# Patient Record
Sex: FEMALE | Race: WHITE | NOT HISPANIC OR LATINO | URBAN - NONMETROPOLITAN AREA
[De-identification: names, ages, dates, MRNs, and addresses within clinical notes are randomized per-mention and may not be internally consistent; named-entity substitution may affect disease eponyms.]

---

## 2023-06-26 DIAGNOSIS — E78.2 MIXED HYPERLIPIDEMIA: ICD-10-CM

## 2023-06-26 DIAGNOSIS — I10 PRIMARY HYPERTENSION: Primary | ICD-10-CM

## 2023-06-26 RX ORDER — SIMVASTATIN 20 MG/1
20 TABLET, FILM COATED ORAL DAILY
Qty: 90 TABLET | Refills: 1 | Status: SHIPPED | OUTPATIENT
Start: 2023-06-26 | End: 2023-12-18 | Stop reason: SDUPTHER

## 2023-06-26 RX ORDER — SIMVASTATIN 20 MG/1
20 TABLET, FILM COATED ORAL DAILY
COMMUNITY
End: 2023-06-26 | Stop reason: SDUPTHER

## 2023-06-26 RX ORDER — AMLODIPINE BESYLATE 10 MG/1
10 TABLET ORAL DAILY
Qty: 90 TABLET | Refills: 1 | Status: SHIPPED | OUTPATIENT
Start: 2023-06-26 | End: 2023-12-18 | Stop reason: SDUPTHER

## 2023-06-26 RX ORDER — AMLODIPINE BESYLATE 10 MG/1
10 TABLET ORAL DAILY
COMMUNITY
End: 2023-06-26 | Stop reason: SDUPTHER

## 2023-07-26 ENCOUNTER — OFFICE VISIT (OUTPATIENT)
Dept: PRIMARY CARE | Facility: CLINIC | Age: 78
End: 2023-07-26
Payer: MEDICARE

## 2023-07-26 VITALS
OXYGEN SATURATION: 98 % | WEIGHT: 100 LBS | HEART RATE: 88 BPM | DIASTOLIC BLOOD PRESSURE: 78 MMHG | SYSTOLIC BLOOD PRESSURE: 126 MMHG | TEMPERATURE: 97.7 F | BODY MASS INDEX: 21.45 KG/M2

## 2023-07-26 DIAGNOSIS — L98.9 SKIN LESION: ICD-10-CM

## 2023-07-26 DIAGNOSIS — I10 PRIMARY HYPERTENSION: Primary | ICD-10-CM

## 2023-07-26 PROCEDURE — 1036F TOBACCO NON-USER: CPT | Performed by: FAMILY MEDICINE

## 2023-07-26 PROCEDURE — 1159F MED LIST DOCD IN RCRD: CPT | Performed by: FAMILY MEDICINE

## 2023-07-26 PROCEDURE — 3078F DIAST BP <80 MM HG: CPT | Performed by: FAMILY MEDICINE

## 2023-07-26 PROCEDURE — 3074F SYST BP LT 130 MM HG: CPT | Performed by: FAMILY MEDICINE

## 2023-07-26 PROCEDURE — 99214 OFFICE O/P EST MOD 30 MIN: CPT | Performed by: FAMILY MEDICINE

## 2023-07-26 ASSESSMENT — ENCOUNTER SYMPTOMS
DIARRHEA: 0
COUGH: 0
APPETITE CHANGE: 0
DYSPHORIC MOOD: 0
ORTHOPNEA: 0
SLEEP DISTURBANCE: 0
NUMBNESS: 0
ABDOMINAL PAIN: 0
PND: 0
SINUS PRESSURE: 0
ARTHRALGIAS: 0
DYSURIA: 0
NERVOUS/ANXIOUS: 0
WHEEZING: 0
MYALGIAS: 0
NECK PAIN: 0
VOMITING: 0
UNEXPECTED WEIGHT CHANGE: 0
RHINORRHEA: 0
LIGHT-HEADEDNESS: 0
ACTIVITY CHANGE: 0
CONSTIPATION: 0
EYE DISCHARGE: 0
BLOOD IN STOOL: 0
FEVER: 0
SORE THROAT: 0
PALPITATIONS: 0
FLANK PAIN: 0
NAUSEA: 0
DIZZINESS: 0
WEAKNESS: 0
SWEATS: 0
BLURRED VISION: 0
EYE ITCHING: 0
HEADACHES: 0
SHORTNESS OF BREATH: 0
HYPERTENSION: 1
HEMATURIA: 0
JOINT SWELLING: 0

## 2023-07-26 NOTE — PROGRESS NOTES
Subjective   Patient ID: Marleni Childers is a 78 y.o. female who presents for Hypertension and Suspicious Skin Lesion (R SHOULDER).    Hypertension  This is a chronic problem. The current episode started more than 1 year ago. The problem has been resolved since onset. The problem is controlled. Pertinent negatives include no anxiety, blurred vision, chest pain, headaches, malaise/fatigue, neck pain, orthopnea, palpitations, peripheral edema, PND, shortness of breath or sweats. Agents associated with hypertension include NSAIDs. Risk factors for coronary artery disease include dyslipidemia, family history and post-menopausal state. There are no compliance problems.     WELL CONTROLLED B/P NOW ON AMLODIPINE /TAKES STATIN     Review of Systems   Constitutional:  Negative for activity change, appetite change, fever, malaise/fatigue and unexpected weight change.   HENT:  Negative for congestion, ear pain, postnasal drip, rhinorrhea, sinus pressure and sore throat.    Eyes:  Negative for blurred vision, discharge, itching and visual disturbance.   Respiratory:  Negative for cough, shortness of breath and wheezing.    Cardiovascular:  Negative for chest pain, palpitations, orthopnea, leg swelling and PND.   Gastrointestinal:  Negative for abdominal pain, blood in stool, constipation, diarrhea, nausea and vomiting.   Endocrine: Negative for cold intolerance, heat intolerance and polyuria.   Genitourinary:  Negative for dysuria, flank pain and hematuria.   Musculoskeletal:  Negative for arthralgias, gait problem, joint swelling, myalgias and neck pain.   Skin:  Negative for rash.        LESION SHOULDER    Allergic/Immunologic: Negative for environmental allergies and food allergies.   Neurological:  Negative for dizziness, syncope, weakness, light-headedness, numbness and headaches.   Psychiatric/Behavioral:  Negative for dysphoric mood and sleep disturbance. The patient is not nervous/anxious.        Objective   /78    Pulse 88   Temp 36.5 °C (97.7 °F)   Wt 45.4 kg (100 lb)   SpO2 98%   BMI 21.45 kg/m²     Physical Exam  Constitutional:       Appearance: Normal appearance.   HENT:      Head: Normocephalic and atraumatic.      Nose: Nose normal.      Mouth/Throat:      Mouth: Mucous membranes are moist.   Eyes:      Extraocular Movements: Extraocular movements intact.      Pupils: Pupils are equal, round, and reactive to light.   Cardiovascular:      Rate and Rhythm: Normal rate and regular rhythm.   Pulmonary:      Effort: Pulmonary effort is normal.      Breath sounds: Normal breath sounds.   Abdominal:      Palpations: Abdomen is soft.   Musculoskeletal:         General: Normal range of motion.      Cervical back: Normal range of motion and neck supple.   Skin:     General: Skin is warm and dry.      Findings: Lesion present.      Comments: SEBORRHEIC KERATOSES /CARRILLO    Neurological:      General: No focal deficit present.      Mental Status: She is alert and oriented to person, place, and time.   Psychiatric:         Mood and Affect: Mood normal.         Behavior: Behavior normal.         Assessment/Plan   Diagnoses and all orders for this visit:  Primary hypertension  Skin lesion

## 2023-08-30 ENCOUNTER — OFFICE VISIT (OUTPATIENT)
Dept: PRIMARY CARE | Facility: CLINIC | Age: 78
End: 2023-08-30
Payer: MEDICARE

## 2023-08-30 VITALS
HEART RATE: 91 BPM | DIASTOLIC BLOOD PRESSURE: 60 MMHG | OXYGEN SATURATION: 96 % | SYSTOLIC BLOOD PRESSURE: 120 MMHG | BODY MASS INDEX: 21.88 KG/M2 | WEIGHT: 102 LBS | TEMPERATURE: 97.4 F

## 2023-08-30 DIAGNOSIS — L98.9 SKIN LESION: ICD-10-CM

## 2023-08-30 PROCEDURE — 3078F DIAST BP <80 MM HG: CPT | Performed by: FAMILY MEDICINE

## 2023-08-30 PROCEDURE — 11301 SHAVE SKIN LESION 0.6-1.0 CM: CPT | Performed by: FAMILY MEDICINE

## 2023-08-30 PROCEDURE — 1159F MED LIST DOCD IN RCRD: CPT | Performed by: FAMILY MEDICINE

## 2023-08-30 PROCEDURE — 3074F SYST BP LT 130 MM HG: CPT | Performed by: FAMILY MEDICINE

## 2023-08-30 PROCEDURE — 88305 TISSUE EXAM BY PATHOLOGIST: CPT | Performed by: DERMATOLOGY

## 2023-08-30 PROCEDURE — 1036F TOBACCO NON-USER: CPT | Performed by: FAMILY MEDICINE

## 2023-08-30 RX ORDER — LIDOCAINE HYDROCHLORIDE 20 MG/ML
2 INJECTION, SOLUTION INFILTRATION; PERINEURAL ONCE
Status: COMPLETED | OUTPATIENT
Start: 2023-08-30 | End: 2023-08-30

## 2023-08-30 RX ADMIN — LIDOCAINE HYDROCHLORIDE 40 MG: 20 INJECTION, SOLUTION INFILTRATION; PERINEURAL at 11:18

## 2023-08-30 NOTE — PATIENT INSTRUCTIONS
CLEAN DRY AND COVERED   BATHING IS FINE JUST DO NOT SCRUB   FOLLOW IF NEEDED   THE OFFICE WILL CALL WITH THE REPORT

## 2023-08-30 NOTE — PROGRESS NOTES
Subjective   Patient ID: Marleni Childers is a 78 y.o. female who presents for Procedure (Shave biopsy Shoulder L ).    HPI HERE TO REMOVE LESION RIGHT SHOULDER PREVIOUSLY  EVALUATED /DARKER AND CHANGING GETTING BIGGER     Review of SystemsNO INTERVAL HISTORICAL CHANGES IN ANY OF THE 12 SYSTEMS REVIEWED OTHER THAN THIS CHANGING SKIN LESION     Objective   /60   Pulse 91   Temp 36.3 °C (97.4 °F)   Wt 46.3 kg (102 lb)   SpO2 96%   BMI 21.88 kg/m²     Physical ExamSINCE RECENT VISIT NO INTERVAL PHYSICAL CHANGES IN ANY OF THE 12 SYSTEMS REVIEWED OTHER THAN THIS CHANGING SKIN LESION     Patient ID: Marleni Childers is a 78 y.o. female.    Shaving Epidermal/Dermal    Date/Time: 8/30/2023 11:39 AM    Performed by: Gina Ivy DO  Authorized by: Gina Ivy DO    Number of Lesions: 1  Lesion 1:     Body area: upper extremity    Upper extremity location: R shoulder    Malignancy: malignancy unknown      Destruction method: curettage      Chemotherapy injection: yes      Comments:  LESION REMOVED BY SHVING AND SENT TO PATHOLOGY FOR EVALUATION   AG2NO3 CAUTERY DONE FOR HEMOSTASIS   MINIMAL BLEEDING AND PAIN REPORTED   PATIENT TOLERATED PROCEDURE WELL AND LEFT THE OFFICE IN STABLE CONDITION         Assessment/Plan

## 2023-09-05 ENCOUNTER — TELEPHONE (OUTPATIENT)
Dept: PRIMARY CARE | Facility: CLINIC | Age: 78
End: 2023-09-05
Payer: MEDICARE

## 2023-09-05 LAB
COMPLETE PATHOLOGY REPORT: NORMAL
CONVERTED CLINICAL DIAGNOSIS-HISTORY: NORMAL
CONVERTED FINAL DIAGNOSIS: NORMAL
CONVERTED FINAL REPORT PDF LINK TO COPY AND PASTE: NORMAL
CONVERTED GROSS DESCRIPTION: NORMAL
CONVERTED MICROSCOPIC DESCRIPTION: NORMAL

## 2023-10-24 ENCOUNTER — OFFICE VISIT (OUTPATIENT)
Dept: PRIMARY CARE | Facility: CLINIC | Age: 78
End: 2023-10-24
Payer: MEDICARE

## 2023-10-24 VITALS
SYSTOLIC BLOOD PRESSURE: 126 MMHG | TEMPERATURE: 97 F | BODY MASS INDEX: 21.45 KG/M2 | HEART RATE: 89 BPM | DIASTOLIC BLOOD PRESSURE: 62 MMHG | WEIGHT: 100 LBS | OXYGEN SATURATION: 98 %

## 2023-10-24 DIAGNOSIS — K64.9 HEMORRHOIDS, UNSPECIFIED HEMORRHOID TYPE: Primary | ICD-10-CM

## 2023-10-24 DIAGNOSIS — R11.0 NAUSEA: ICD-10-CM

## 2023-10-24 DIAGNOSIS — K59.00 CONSTIPATION, UNSPECIFIED CONSTIPATION TYPE: ICD-10-CM

## 2023-10-24 PROBLEM — H02.9 LESION OF EYELID: Status: ACTIVE | Noted: 2023-10-24

## 2023-10-24 PROBLEM — K21.9 GASTROESOPHAGEAL REFLUX DISEASE: Status: ACTIVE | Noted: 2023-10-24

## 2023-10-24 PROBLEM — L81.9 ATYPICAL PIGMENTED LESION: Status: ACTIVE | Noted: 2023-10-24

## 2023-10-24 PROBLEM — H71.91 CHOLESTEATOMA OF RIGHT EAR: Status: ACTIVE | Noted: 2023-10-24

## 2023-10-24 PROBLEM — L98.9 SKIN LESION: Status: ACTIVE | Noted: 2023-10-24

## 2023-10-24 PROBLEM — M81.0 OSTEOPOROSIS: Status: ACTIVE | Noted: 2023-10-24

## 2023-10-24 PROBLEM — B07.9 VERRUCOUS LESION OF SKIN: Status: ACTIVE | Noted: 2023-10-24

## 2023-10-24 PROBLEM — M19.90 ARTHRITIS: Status: ACTIVE | Noted: 2023-10-24

## 2023-10-24 PROBLEM — N18.30 CKD (CHRONIC KIDNEY DISEASE), STAGE III (MULTI): Status: ACTIVE | Noted: 2023-10-24

## 2023-10-24 PROBLEM — E78.00 PURE HYPERCHOLESTEROLEMIA: Status: ACTIVE | Noted: 2023-10-24

## 2023-10-24 PROCEDURE — 1036F TOBACCO NON-USER: CPT

## 2023-10-24 PROCEDURE — 99214 OFFICE O/P EST MOD 30 MIN: CPT

## 2023-10-24 PROCEDURE — 1160F RVW MEDS BY RX/DR IN RCRD: CPT

## 2023-10-24 PROCEDURE — 3078F DIAST BP <80 MM HG: CPT

## 2023-10-24 PROCEDURE — 1159F MED LIST DOCD IN RCRD: CPT

## 2023-10-24 PROCEDURE — 3074F SYST BP LT 130 MM HG: CPT

## 2023-10-24 RX ORDER — DOCUSATE SODIUM 100 MG/1
100 CAPSULE, LIQUID FILLED ORAL 2 TIMES DAILY PRN
Qty: 28 CAPSULE | Refills: 0 | Status: SHIPPED | OUTPATIENT
Start: 2023-10-24 | End: 2023-11-07

## 2023-10-24 RX ORDER — ONDANSETRON 4 MG/1
4 TABLET, ORALLY DISINTEGRATING ORAL EVERY 8 HOURS PRN
Qty: 20 TABLET | Refills: 0 | Status: SHIPPED | OUTPATIENT
Start: 2023-10-24 | End: 2023-10-31

## 2023-10-24 NOTE — PATIENT INSTRUCTIONS
Steroid cream continue  Stool softener twice daily for constipation  Zofran as needed every 8 hours for nausea    Thank you for coming in today, if any questions or concerns arise, please call my office.   HEATH Park-CNP

## 2023-10-24 NOTE — PROGRESS NOTES
Subjective   Patient ID: Marleni Childers is a 78 y.o. female who presents for Ear Drainage (R ear Scarring. Current drainage causing her to vomit.) and Rectal Bleeding (Pink in the toilet and bright red on the toilet paper when wiping. Small pellet like stools.).  Subjective  Marleni Childers is a 78 y.o. female who presents for evaluation of possible hemorrhoids. Onset of symptoms was sudden. Symptoms have been stable since that time. Symptoms include: bleeding which only occurs with bowel movements and painful defecation. Patient denies family hx of colorectal CA, maroon colored stools, melena, and weight loss. Treatment to date has been anusol HC.    Objective  /62   Pulse 89   Temp 36.1 °C (97 °F)   Wt 45.4 kg (100 lb)   SpO2 98%   BMI 21.45 kg/m²   Rectal: deferred.    Assessment/Plan  External hemorrhoid  Internal hemorrhoids.  See orders for medications.          Vitals:    10/24/23 1528   BP: 126/62   Pulse: 89   Temp: 36.1 °C (97 °F)   SpO2: 98%       Review of Systems    Objective   Physical Exam  HENT:      Right Ear: Tympanic membrane is retracted.      Left Ear: Tympanic membrane is retracted.         Assessment/Plan   Problem List Items Addressed This Visit    None  Visit Diagnoses       Hemorrhoids, unspecified hemorrhoid type    -  Primary    Constipation, unspecified constipation type        Relevant Medications    docusate sodium (Colace) 100 mg capsule    Nausea        Relevant Medications    ondansetron ODT (Zofran-ODT) 4 mg disintegrating tablet                 Thank you for coming in today, please call my office if you have any concerns or questions.     Aly JOE, CNP

## 2023-12-18 DIAGNOSIS — I10 PRIMARY HYPERTENSION: ICD-10-CM

## 2023-12-18 DIAGNOSIS — E78.2 MIXED HYPERLIPIDEMIA: ICD-10-CM

## 2023-12-18 RX ORDER — SIMVASTATIN 20 MG/1
20 TABLET, FILM COATED ORAL DAILY
Qty: 90 TABLET | Refills: 1 | Status: SHIPPED | OUTPATIENT
Start: 2023-12-18 | End: 2024-03-21

## 2023-12-18 RX ORDER — AMLODIPINE BESYLATE 10 MG/1
10 TABLET ORAL DAILY
Qty: 90 TABLET | Refills: 1 | Status: SHIPPED | OUTPATIENT
Start: 2023-12-18 | End: 2024-03-21

## 2024-01-12 ENCOUNTER — OFFICE VISIT (OUTPATIENT)
Dept: PRIMARY CARE | Facility: CLINIC | Age: 79
End: 2024-01-12
Payer: MEDICARE

## 2024-01-12 VITALS
BODY MASS INDEX: 21.14 KG/M2 | SYSTOLIC BLOOD PRESSURE: 124 MMHG | OXYGEN SATURATION: 98 % | DIASTOLIC BLOOD PRESSURE: 60 MMHG | HEIGHT: 57 IN | HEART RATE: 90 BPM | TEMPERATURE: 97.8 F | WEIGHT: 98 LBS

## 2024-01-12 DIAGNOSIS — Z12.31 ENCOUNTER FOR SCREENING MAMMOGRAM FOR BREAST CANCER: ICD-10-CM

## 2024-01-12 DIAGNOSIS — Z78.0 ASYMPTOMATIC MENOPAUSAL STATE: ICD-10-CM

## 2024-01-12 DIAGNOSIS — Z00.00 ROUTINE GENERAL MEDICAL EXAMINATION AT HEALTH CARE FACILITY: Primary | ICD-10-CM

## 2024-01-12 PROCEDURE — 1170F FXNL STATUS ASSESSED: CPT | Performed by: FAMILY MEDICINE

## 2024-01-12 PROCEDURE — 1036F TOBACCO NON-USER: CPT | Performed by: FAMILY MEDICINE

## 2024-01-12 PROCEDURE — 1159F MED LIST DOCD IN RCRD: CPT | Performed by: FAMILY MEDICINE

## 2024-01-12 PROCEDURE — 3078F DIAST BP <80 MM HG: CPT | Performed by: FAMILY MEDICINE

## 2024-01-12 PROCEDURE — 3074F SYST BP LT 130 MM HG: CPT | Performed by: FAMILY MEDICINE

## 2024-01-12 PROCEDURE — G0439 PPPS, SUBSEQ VISIT: HCPCS | Performed by: FAMILY MEDICINE

## 2024-01-12 ASSESSMENT — ENCOUNTER SYMPTOMS
DEPRESSION: 0
LOSS OF SENSATION IN FEET: 0
SLEEP DISTURBANCE: 0
NAUSEA: 0
EYE DISCHARGE: 0
HEADACHES: 0
NERVOUS/ANXIOUS: 0
DYSURIA: 0
NUMBNESS: 0
ACTIVITY CHANGE: 0
FEVER: 0
FLANK PAIN: 0
DYSPHORIC MOOD: 0
BLOOD IN STOOL: 0
EYE ITCHING: 0
PALPITATIONS: 0
JOINT SWELLING: 0
ARTHRALGIAS: 0
LIGHT-HEADEDNESS: 0
UNEXPECTED WEIGHT CHANGE: 0
SHORTNESS OF BREATH: 0
ABDOMINAL PAIN: 0
CONSTIPATION: 0
COUGH: 0
RHINORRHEA: 0
APPETITE CHANGE: 0
SORE THROAT: 0
DIARRHEA: 0
WHEEZING: 0
MYALGIAS: 0
OCCASIONAL FEELINGS OF UNSTEADINESS: 0
VOMITING: 0
SINUS PRESSURE: 0
HEMATURIA: 0
WEAKNESS: 0
DIZZINESS: 0

## 2024-01-12 ASSESSMENT — PATIENT HEALTH QUESTIONNAIRE - PHQ9
SUM OF ALL RESPONSES TO PHQ9 QUESTIONS 1 AND 2: 0
2. FEELING DOWN, DEPRESSED OR HOPELESS: NOT AT ALL
1. LITTLE INTEREST OR PLEASURE IN DOING THINGS: NOT AT ALL

## 2024-01-12 ASSESSMENT — ACTIVITIES OF DAILY LIVING (ADL)
MANAGING_FINANCES: INDEPENDENT
TAKING_MEDICATION: INDEPENDENT
BATHING: INDEPENDENT
DRESSING: INDEPENDENT
DOING_HOUSEWORK: INDEPENDENT
GROCERY_SHOPPING: INDEPENDENT

## 2024-01-12 NOTE — PROGRESS NOTES
"Subjective   Reason for Visit: Marleni Childers is an 78 y.o. female here for a Medicare Wellness visit.     Past Medical, Surgical, and Family History reviewed and updated in chart.    Reviewed all medications by prescribing practitioner or clinical pharmacist (such as prescriptions, OTCs, herbal therapies and supplements) and documented in the medical record.    HPI FEELS GOOD MOST DAYS     Patient Care Team:  Gina Ivy DO as PCP - General     Review of Systems   Constitutional:  Negative for activity change, appetite change, fever and unexpected weight change.   HENT:  Negative for congestion, ear pain, postnasal drip, rhinorrhea, sinus pressure and sore throat.    Eyes:  Negative for discharge, itching and visual disturbance.   Respiratory:  Negative for cough, shortness of breath and wheezing.    Cardiovascular:  Negative for chest pain, palpitations and leg swelling.   Gastrointestinal:  Negative for abdominal pain, blood in stool, constipation, diarrhea, nausea and vomiting.   Endocrine: Negative for cold intolerance, heat intolerance and polyuria.   Genitourinary:  Negative for dysuria, flank pain and hematuria.   Musculoskeletal:  Negative for arthralgias, gait problem, joint swelling and myalgias.   Skin:  Negative for rash.   Allergic/Immunologic: Negative for environmental allergies and food allergies.   Neurological:  Negative for dizziness, syncope, weakness, light-headedness, numbness and headaches.   Psychiatric/Behavioral:  Negative for dysphoric mood and sleep disturbance. The patient is not nervous/anxious.        Objective   Vitals:  /60   Pulse 90   Temp 36.6 °C (97.8 °F)   Ht 1.448 m (4' 9\")   Wt (!) 44.5 kg (98 lb)   SpO2 98%   BMI 21.21 kg/m²       Physical Exam  Vitals and nursing note reviewed.   Constitutional:       Appearance: Normal appearance.   HENT:      Head: Normocephalic.      Mouth/Throat:      Mouth: Mucous membranes are moist.   Cardiovascular:      Rate and " Rhythm: Normal rate and regular rhythm.      Pulses: Normal pulses.      Heart sounds: Normal heart sounds. No murmur heard.     No friction rub. No gallop.   Pulmonary:      Effort: Pulmonary effort is normal. No respiratory distress.      Breath sounds: Normal breath sounds. No wheezing.   Abdominal:      General: Bowel sounds are normal. There is no distension.      Palpations: Abdomen is soft.      Tenderness: There is no abdominal tenderness.   Musculoskeletal:         General: No deformity. Normal range of motion.   Skin:     General: Skin is warm and dry.      Capillary Refill: Capillary refill takes less than 2 seconds.   Neurological:      General: No focal deficit present.      Mental Status: She is alert and oriented to person, place, and time.   Psychiatric:         Mood and Affect: Mood normal.         Assessment/Plan   Problem List Items Addressed This Visit       Encounter for screening mammogram for breast cancer - Primary    Relevant Orders    BI mammo bilateral screening tomosynthesis    Asymptomatic menopausal state    Relevant Orders    XR DEXA bone density

## 2024-03-21 DIAGNOSIS — E78.2 MIXED HYPERLIPIDEMIA: ICD-10-CM

## 2024-03-21 DIAGNOSIS — I10 PRIMARY HYPERTENSION: ICD-10-CM

## 2024-03-21 RX ORDER — AMLODIPINE BESYLATE 10 MG/1
10 TABLET ORAL DAILY
Qty: 90 TABLET | Refills: 1 | Status: SHIPPED | OUTPATIENT
Start: 2024-03-21

## 2024-03-21 RX ORDER — SIMVASTATIN 20 MG/1
20 TABLET, FILM COATED ORAL DAILY
Qty: 90 TABLET | Refills: 1 | Status: SHIPPED | OUTPATIENT
Start: 2024-03-21

## 2024-06-14 DIAGNOSIS — I10 PRIMARY HYPERTENSION: ICD-10-CM

## 2024-06-14 DIAGNOSIS — E78.2 MIXED HYPERLIPIDEMIA: ICD-10-CM

## 2024-06-14 RX ORDER — AMLODIPINE BESYLATE 10 MG/1
10 TABLET ORAL DAILY
Qty: 90 TABLET | Refills: 1 | Status: SHIPPED | OUTPATIENT
Start: 2024-06-14

## 2024-06-14 RX ORDER — SIMVASTATIN 20 MG/1
20 TABLET, FILM COATED ORAL DAILY
Qty: 90 TABLET | Refills: 1 | Status: SHIPPED | OUTPATIENT
Start: 2024-06-14

## 2024-07-19 ENCOUNTER — APPOINTMENT (OUTPATIENT)
Dept: PRIMARY CARE | Facility: CLINIC | Age: 79
End: 2024-07-19
Payer: MEDICARE

## 2024-07-22 ENCOUNTER — APPOINTMENT (OUTPATIENT)
Dept: PRIMARY CARE | Facility: CLINIC | Age: 79
End: 2024-07-22
Payer: MEDICARE

## 2024-07-22 VITALS
DIASTOLIC BLOOD PRESSURE: 72 MMHG | HEART RATE: 101 BPM | OXYGEN SATURATION: 99 % | TEMPERATURE: 96.6 F | WEIGHT: 98 LBS | BODY MASS INDEX: 21.21 KG/M2 | SYSTOLIC BLOOD PRESSURE: 124 MMHG

## 2024-07-22 DIAGNOSIS — E78.00 PURE HYPERCHOLESTEROLEMIA: ICD-10-CM

## 2024-07-22 DIAGNOSIS — N18.31 STAGE 3A CHRONIC KIDNEY DISEASE (MULTI): ICD-10-CM

## 2024-07-22 DIAGNOSIS — I10 PRIMARY HYPERTENSION: Primary | ICD-10-CM

## 2024-07-22 LAB
ALBUMIN SERPL BCP-MCNC: 4.4 G/DL (ref 3.4–5)
ALP SERPL-CCNC: 89 U/L (ref 33–136)
ALT SERPL W P-5'-P-CCNC: 9 U/L (ref 7–45)
ANION GAP SERPL CALC-SCNC: 16 MMOL/L (ref 10–20)
AST SERPL W P-5'-P-CCNC: 19 U/L (ref 9–39)
BASOPHILS # BLD AUTO: 0.06 X10*3/UL (ref 0–0.1)
BASOPHILS NFR BLD AUTO: 0.6 %
BILIRUB SERPL-MCNC: 0.5 MG/DL (ref 0–1.2)
BUN SERPL-MCNC: 13 MG/DL (ref 6–23)
CALCIUM SERPL-MCNC: 9.7 MG/DL (ref 8.6–10.3)
CHLORIDE SERPL-SCNC: 97 MMOL/L (ref 98–107)
CHOLEST SERPL-MCNC: 190 MG/DL (ref 0–199)
CHOLESTEROL/HDL RATIO: 2.7
CO2 SERPL-SCNC: 30 MMOL/L (ref 21–32)
CREAT SERPL-MCNC: 1.02 MG/DL (ref 0.5–1.05)
EGFRCR SERPLBLD CKD-EPI 2021: 56 ML/MIN/1.73M*2
EOSINOPHIL # BLD AUTO: 0.14 X10*3/UL (ref 0–0.4)
EOSINOPHIL NFR BLD AUTO: 1.4 %
ERYTHROCYTE [DISTWIDTH] IN BLOOD BY AUTOMATED COUNT: 13.4 % (ref 11.5–14.5)
GLUCOSE SERPL-MCNC: 66 MG/DL (ref 74–99)
HCT VFR BLD AUTO: 41.6 % (ref 36–46)
HDLC SERPL-MCNC: 69.4 MG/DL
HGB BLD-MCNC: 13.6 G/DL (ref 12–16)
IMM GRANULOCYTES # BLD AUTO: 0.02 X10*3/UL (ref 0–0.5)
IMM GRANULOCYTES NFR BLD AUTO: 0.2 % (ref 0–0.9)
LDLC SERPL CALC-MCNC: 73 MG/DL
LYMPHOCYTES # BLD AUTO: 2.18 X10*3/UL (ref 0.8–3)
LYMPHOCYTES NFR BLD AUTO: 22.5 %
MCH RBC QN AUTO: 28.8 PG (ref 26–34)
MCHC RBC AUTO-ENTMCNC: 32.7 G/DL (ref 32–36)
MCV RBC AUTO: 88 FL (ref 80–100)
MONOCYTES # BLD AUTO: 0.62 X10*3/UL (ref 0.05–0.8)
MONOCYTES NFR BLD AUTO: 6.4 %
NEUTROPHILS # BLD AUTO: 6.65 X10*3/UL (ref 1.6–5.5)
NEUTROPHILS NFR BLD AUTO: 68.9 %
NON HDL CHOLESTEROL: 121 MG/DL (ref 0–149)
NRBC BLD-RTO: 0 /100 WBCS (ref 0–0)
PLATELET # BLD AUTO: 447 X10*3/UL (ref 150–450)
POTASSIUM SERPL-SCNC: 3.6 MMOL/L (ref 3.5–5.3)
PROT SERPL-MCNC: 7.3 G/DL (ref 6.4–8.2)
RBC # BLD AUTO: 4.73 X10*6/UL (ref 4–5.2)
SODIUM SERPL-SCNC: 139 MMOL/L (ref 136–145)
TRIGL SERPL-MCNC: 238 MG/DL (ref 0–149)
VLDL: 48 MG/DL (ref 0–40)
WBC # BLD AUTO: 9.7 X10*3/UL (ref 4.4–11.3)

## 2024-07-22 PROCEDURE — 99214 OFFICE O/P EST MOD 30 MIN: CPT | Performed by: FAMILY MEDICINE

## 2024-07-22 PROCEDURE — 1036F TOBACCO NON-USER: CPT | Performed by: FAMILY MEDICINE

## 2024-07-22 PROCEDURE — 80061 LIPID PANEL: CPT

## 2024-07-22 PROCEDURE — 3078F DIAST BP <80 MM HG: CPT | Performed by: FAMILY MEDICINE

## 2024-07-22 PROCEDURE — 80053 COMPREHEN METABOLIC PANEL: CPT

## 2024-07-22 PROCEDURE — 3074F SYST BP LT 130 MM HG: CPT | Performed by: FAMILY MEDICINE

## 2024-07-22 PROCEDURE — 36415 COLL VENOUS BLD VENIPUNCTURE: CPT

## 2024-07-22 PROCEDURE — 85025 COMPLETE CBC W/AUTO DIFF WBC: CPT

## 2024-07-22 PROCEDURE — 1159F MED LIST DOCD IN RCRD: CPT | Performed by: FAMILY MEDICINE

## 2024-07-22 ASSESSMENT — ENCOUNTER SYMPTOMS
HEMATURIA: 0
EYE ITCHING: 0
ARTHRALGIAS: 0
SLEEP DISTURBANCE: 0
PALPITATIONS: 0
APPETITE CHANGE: 0
VOMITING: 0
JOINT SWELLING: 0
SORE THROAT: 0
DYSURIA: 0
COUGH: 0
HEADACHES: 0
WEAKNESS: 0
DIARRHEA: 0
BLOOD IN STOOL: 0
LIGHT-HEADEDNESS: 0
RHINORRHEA: 0
ACTIVITY CHANGE: 0
ABDOMINAL PAIN: 0
DYSPHORIC MOOD: 0
NAUSEA: 0
DIZZINESS: 0
SINUS PRESSURE: 0
NUMBNESS: 0
FEVER: 0
EYE DISCHARGE: 0
FLANK PAIN: 0
WHEEZING: 0
MYALGIAS: 0
UNEXPECTED WEIGHT CHANGE: 0
CONSTIPATION: 0
SHORTNESS OF BREATH: 0
NERVOUS/ANXIOUS: 0

## 2024-07-22 NOTE — PROGRESS NOTES
Subjective   Patient ID: Marleni Childers is a 79 y.o. female who presents for Follow-up (ROUTINE 6 MONTH FOLLOW UP).    HPI FEELS WELL MOST DAYS     Review of Systems   Constitutional:  Negative for activity change, appetite change, fever and unexpected weight change.   HENT:  Negative for congestion, ear pain, postnasal drip, rhinorrhea, sinus pressure and sore throat.    Eyes:  Negative for discharge, itching and visual disturbance.   Respiratory:  Negative for cough, shortness of breath and wheezing.    Cardiovascular:  Negative for chest pain, palpitations and leg swelling.   Gastrointestinal:  Negative for abdominal pain, blood in stool, constipation, diarrhea, nausea and vomiting.   Endocrine: Negative for cold intolerance, heat intolerance and polyuria.   Genitourinary:  Negative for dysuria, flank pain and hematuria.   Musculoskeletal:  Negative for arthralgias, gait problem, joint swelling and myalgias.   Skin:  Negative for rash.   Allergic/Immunologic: Negative for environmental allergies and food allergies.   Neurological:  Negative for dizziness, syncope, weakness, light-headedness, numbness and headaches.   Psychiatric/Behavioral:  Negative for dysphoric mood and sleep disturbance. The patient is not nervous/anxious.        Objective   /72   Pulse 101   Temp 35.9 °C (96.6 °F)   Wt (!) 44.5 kg (98 lb)   SpO2 99%   BMI 21.21 kg/m²     Physical Exam  Vitals and nursing note reviewed.   Constitutional:       Appearance: Normal appearance.   HENT:      Head: Normocephalic.      Mouth/Throat:      Mouth: Mucous membranes are moist.   Cardiovascular:      Rate and Rhythm: Normal rate and regular rhythm.      Pulses: Normal pulses.      Heart sounds: Normal heart sounds. No murmur heard.     No friction rub. No gallop.   Pulmonary:      Effort: Pulmonary effort is normal. No respiratory distress.      Breath sounds: Normal breath sounds. No wheezing.   Abdominal:      General: Bowel sounds are normal.  There is no distension.      Palpations: Abdomen is soft.      Tenderness: There is no abdominal tenderness.   Musculoskeletal:         General: No deformity. Normal range of motion.   Skin:     General: Skin is warm and dry.      Capillary Refill: Capillary refill takes less than 2 seconds.   Neurological:      General: No focal deficit present.      Mental Status: She is alert and oriented to person, place, and time.   Psychiatric:         Mood and Affect: Mood normal.         Assessment/Plan   Problem List Items Addressed This Visit             ICD-10-CM    Primary hypertension - Primary I10    Relevant Orders    Lipid Panel    Comprehensive Metabolic Panel    CBC and Auto Differential    CKD (chronic kidney disease), stage III (Multi) N18.30    Relevant Orders    Lipid Panel    Comprehensive Metabolic Panel    CBC and Auto Differential    Pure hypercholesterolemia E78.00    Relevant Orders    Lipid Panel    Comprehensive Metabolic Panel    CBC and Auto Differential

## 2024-07-29 ENCOUNTER — TELEPHONE (OUTPATIENT)
Dept: PRIMARY CARE | Facility: CLINIC | Age: 79
End: 2024-07-29
Payer: MEDICARE

## 2024-07-29 NOTE — TELEPHONE ENCOUNTER
----- Message from Gina Ivy sent at 7/29/2024 10:52 AM EDT -----  Triglycerides got worse   Rest are all normal  FAST FOODS AND GOODY FOOD WITH FAT ARE HIGHER IN TRIGLYCERIDES   FOLLOW IN 6 MONTHS

## 2024-07-31 NOTE — TELEPHONE ENCOUNTER
Spoke with Marleni and she was not fasting so I informed her to fast at her next blood draw.  She is scheduled for 6 months out.

## 2024-09-13 DIAGNOSIS — E78.2 MIXED HYPERLIPIDEMIA: ICD-10-CM

## 2024-09-13 DIAGNOSIS — I10 PRIMARY HYPERTENSION: ICD-10-CM

## 2024-09-13 RX ORDER — SIMVASTATIN 20 MG/1
20 TABLET, FILM COATED ORAL DAILY
Qty: 90 TABLET | Refills: 1 | Status: SHIPPED | OUTPATIENT
Start: 2024-09-13

## 2024-09-13 RX ORDER — AMLODIPINE BESYLATE 10 MG/1
10 TABLET ORAL DAILY
Qty: 90 TABLET | Refills: 1 | Status: SHIPPED | OUTPATIENT
Start: 2024-09-13

## 2024-10-17 ENCOUNTER — HOSPITAL ENCOUNTER (OUTPATIENT)
Dept: RADIOLOGY | Facility: HOSPITAL | Age: 79
Discharge: HOME | End: 2024-10-17
Payer: MEDICARE

## 2024-10-17 VITALS — WEIGHT: 97 LBS | BODY MASS INDEX: 20.93 KG/M2 | HEIGHT: 57 IN

## 2024-10-17 DIAGNOSIS — Z78.0 ASYMPTOMATIC MENOPAUSAL STATE: ICD-10-CM

## 2024-10-17 DIAGNOSIS — Z12.31 ENCOUNTER FOR SCREENING MAMMOGRAM FOR BREAST CANCER: ICD-10-CM

## 2024-10-17 PROCEDURE — 77067 SCR MAMMO BI INCL CAD: CPT | Performed by: STUDENT IN AN ORGANIZED HEALTH CARE EDUCATION/TRAINING PROGRAM

## 2024-10-17 PROCEDURE — 77080 DXA BONE DENSITY AXIAL: CPT

## 2024-10-17 PROCEDURE — 77080 DXA BONE DENSITY AXIAL: CPT | Performed by: RADIOLOGY

## 2024-10-17 PROCEDURE — 77063 BREAST TOMOSYNTHESIS BI: CPT | Performed by: STUDENT IN AN ORGANIZED HEALTH CARE EDUCATION/TRAINING PROGRAM

## 2024-10-17 PROCEDURE — 77067 SCR MAMMO BI INCL CAD: CPT

## 2024-11-01 ENCOUNTER — TELEPHONE (OUTPATIENT)
Dept: PRIMARY CARE | Facility: CLINIC | Age: 79
End: 2024-11-01
Payer: MEDICARE

## 2024-11-01 NOTE — TELEPHONE ENCOUNTER
Spoke with Marleni regarding this she says she is not on calcium and vitamin D because she has some problems taking vitamins.    She would like to try the Actonel.  Please send and let me know when so I can let her know.

## 2024-11-01 NOTE — TELEPHONE ENCOUNTER
----- Message from Gina Ivy sent at 10/31/2024  4:40 PM EDT -----  ABNORMAL BONE DENSITY/OSTEOPOROSIS AT THIS HIP   NO HISTORY OF FRACTURE   IS SHE ON CALCIUM WITH VIT D   I GUESS SHE COULD TAKE ACTONEL

## 2024-11-07 DIAGNOSIS — M81.0 AGE-RELATED OSTEOPOROSIS WITHOUT CURRENT PATHOLOGICAL FRACTURE: Primary | ICD-10-CM

## 2024-11-08 DIAGNOSIS — M81.0 AGE-RELATED OSTEOPOROSIS WITHOUT CURRENT PATHOLOGICAL FRACTURE: Primary | ICD-10-CM

## 2024-11-08 RX ORDER — RISEDRONATE SODIUM 35 MG/1
35 TABLET, FILM COATED ORAL
Qty: 4 TABLET | Refills: 11 | Status: SHIPPED | OUTPATIENT
Start: 2024-11-08 | End: 2025-11-08

## 2024-11-14 RX ORDER — RISEDRONATE SODIUM 35 MG/1
35 TABLET, FILM COATED ORAL
Qty: 4 TABLET | Refills: 11 | Status: SHIPPED | OUTPATIENT
Start: 2024-11-14 | End: 2025-11-14

## 2024-11-19 ENCOUNTER — TELEPHONE (OUTPATIENT)
Dept: PRIMARY CARE | Facility: CLINIC | Age: 79
End: 2024-11-19
Payer: MEDICARE

## 2024-11-19 NOTE — TELEPHONE ENCOUNTER
Marleni went to  her Risedronate sodium.  She says that she has kidney issues and bleeding with her hemorrhoids and read that this the med can cause some issues due to this.  She would like to know if she should be taking this.

## 2024-12-17 DIAGNOSIS — E78.2 MIXED HYPERLIPIDEMIA: ICD-10-CM

## 2024-12-17 DIAGNOSIS — I10 PRIMARY HYPERTENSION: ICD-10-CM

## 2024-12-17 RX ORDER — SIMVASTATIN 20 MG/1
20 TABLET, FILM COATED ORAL DAILY
Qty: 90 TABLET | Refills: 1 | Status: SHIPPED | OUTPATIENT
Start: 2024-12-17

## 2024-12-17 RX ORDER — AMLODIPINE BESYLATE 10 MG/1
10 TABLET ORAL DAILY
Qty: 90 TABLET | Refills: 1 | Status: SHIPPED | OUTPATIENT
Start: 2024-12-17

## 2025-01-30 ENCOUNTER — APPOINTMENT (OUTPATIENT)
Dept: PRIMARY CARE | Facility: CLINIC | Age: 80
End: 2025-01-30
Payer: MEDICARE

## 2025-01-30 DIAGNOSIS — E78.00 PURE HYPERCHOLESTEROLEMIA: ICD-10-CM

## 2025-01-30 DIAGNOSIS — I10 PRIMARY HYPERTENSION: ICD-10-CM

## 2025-01-30 NOTE — PROGRESS NOTES
Marleni came into the clinic to have her labs drawn. Verified name and . ABN signed left without incident.

## 2025-01-31 ENCOUNTER — CLINICAL SUPPORT (OUTPATIENT)
Dept: PRIMARY CARE | Facility: CLINIC | Age: 80
End: 2025-01-31
Payer: MEDICARE

## 2025-01-31 DIAGNOSIS — I10 PRIMARY HYPERTENSION: ICD-10-CM

## 2025-01-31 DIAGNOSIS — E78.00 PURE HYPERCHOLESTEROLEMIA: ICD-10-CM

## 2025-02-02 LAB
ALBUMIN SERPL-MCNC: 4.6 G/DL (ref 3.6–5.1)
ALP SERPL-CCNC: 87 U/L (ref 37–153)
ALT SERPL-CCNC: 8 U/L (ref 6–29)
ANION GAP SERPL CALCULATED.4IONS-SCNC: 15 MMOL/L (CALC) (ref 7–17)
AST SERPL-CCNC: 19 U/L (ref 10–35)
BILIRUB SERPL-MCNC: 0.5 MG/DL (ref 0.2–1.2)
BUN SERPL-MCNC: 13 MG/DL (ref 7–25)
CALCIUM SERPL-MCNC: 9.9 MG/DL (ref 8.6–10.4)
CHLORIDE SERPL-SCNC: 101 MMOL/L (ref 98–110)
CHOLEST SERPL-MCNC: 188 MG/DL
CHOLEST/HDLC SERPL: 2.4 (CALC)
CO2 SERPL-SCNC: 26 MMOL/L (ref 20–32)
CREAT SERPL-MCNC: 0.98 MG/DL (ref 0.6–0.95)
EGFRCR SERPLBLD CKD-EPI 2021: 58 ML/MIN/1.73M2
GLUCOSE SERPL-MCNC: 87 MG/DL (ref 65–99)
HDLC SERPL-MCNC: 80 MG/DL
LDLC SERPL CALC-MCNC: 77 MG/DL (CALC)
NONHDLC SERPL-MCNC: 108 MG/DL (CALC)
POTASSIUM SERPL-SCNC: 3.4 MMOL/L (ref 3.5–5.3)
PROT SERPL-MCNC: 7.4 G/DL (ref 6.1–8.1)
SODIUM SERPL-SCNC: 142 MMOL/L (ref 135–146)
TRIGL SERPL-MCNC: 223 MG/DL

## 2025-02-03 ENCOUNTER — APPOINTMENT (OUTPATIENT)
Dept: PRIMARY CARE | Facility: CLINIC | Age: 80
End: 2025-02-03
Payer: MEDICARE

## 2025-02-03 VITALS
HEART RATE: 85 BPM | SYSTOLIC BLOOD PRESSURE: 120 MMHG | TEMPERATURE: 96 F | DIASTOLIC BLOOD PRESSURE: 78 MMHG | WEIGHT: 97.7 LBS | BODY MASS INDEX: 21.14 KG/M2 | OXYGEN SATURATION: 99 %

## 2025-02-03 DIAGNOSIS — N18.31 STAGE 3A CHRONIC KIDNEY DISEASE (MULTI): Primary | ICD-10-CM

## 2025-02-03 DIAGNOSIS — D49.2 ATYPICAL SQUAMOPROLIFERATIVE SKIN LESION: ICD-10-CM

## 2025-02-03 PROCEDURE — 99214 OFFICE O/P EST MOD 30 MIN: CPT | Performed by: FAMILY MEDICINE

## 2025-02-03 PROCEDURE — 3074F SYST BP LT 130 MM HG: CPT | Performed by: FAMILY MEDICINE

## 2025-02-03 PROCEDURE — 1159F MED LIST DOCD IN RCRD: CPT | Performed by: FAMILY MEDICINE

## 2025-02-03 PROCEDURE — 1036F TOBACCO NON-USER: CPT | Performed by: FAMILY MEDICINE

## 2025-02-03 PROCEDURE — 3078F DIAST BP <80 MM HG: CPT | Performed by: FAMILY MEDICINE

## 2025-02-03 ASSESSMENT — ENCOUNTER SYMPTOMS
SORE THROAT: 0
EYE ITCHING: 0
JOINT SWELLING: 0
CONSTIPATION: 0
UNEXPECTED WEIGHT CHANGE: 0
ABDOMINAL PAIN: 0
ARTHRALGIAS: 0
NERVOUS/ANXIOUS: 0
VOMITING: 0
FEVER: 0
DIARRHEA: 0
PALPITATIONS: 0
WEAKNESS: 0
COUGH: 0
SLEEP DISTURBANCE: 0
DIZZINESS: 0
BLOOD IN STOOL: 0
LIGHT-HEADEDNESS: 0
APPETITE CHANGE: 0
DYSURIA: 0
NUMBNESS: 0
WHEEZING: 0
NAUSEA: 0
FLANK PAIN: 0
ACTIVITY CHANGE: 0
RHINORRHEA: 0
EYE DISCHARGE: 0
DYSPHORIC MOOD: 0
SHORTNESS OF BREATH: 0
SINUS PRESSURE: 0
HEMATURIA: 0
MYALGIAS: 0
HEADACHES: 0

## 2025-02-03 NOTE — PROGRESS NOTES
Subjective   Patient ID: Marleni Childers is a 79 y.o. female who presents for Follow-up (HERE FOR A LAB RESULT  FOLLOW UP, PT STATES SHE HAS A SPOT ON HER FACE THAT SHE WOULD LIKE LOOKED, SPOT APPEARS TO BE DRY WITH A LITTLE REDNESS ).    HPI PATIENT HAS STABLE STG 3 CKD GFR IS 58  TODAY CONCERNED WITH FACIAL LESION AND WISHES THAT I WOULD BIOPSY THIS     Review of Systems   Constitutional:  Negative for activity change, appetite change, fever and unexpected weight change.   HENT:  Negative for congestion, ear pain, postnasal drip, rhinorrhea, sinus pressure and sore throat.    Eyes:  Negative for discharge, itching and visual disturbance.   Respiratory:  Negative for cough, shortness of breath and wheezing.    Cardiovascular:  Negative for chest pain, palpitations and leg swelling.   Gastrointestinal:  Negative for abdominal pain, blood in stool, constipation, diarrhea, nausea and vomiting.   Endocrine: Negative for cold intolerance, heat intolerance and polyuria.   Genitourinary:  Negative for dysuria, flank pain and hematuria.        CKD   Musculoskeletal:  Negative for arthralgias, gait problem, joint swelling and myalgias.   Skin:  Negative for rash.        SQUAMOUS PAPULAR LESION LEFT CHECK  POSSIBLE CA   OFFERED REFERRAL    Allergic/Immunologic: Negative for environmental allergies and food allergies.   Neurological:  Negative for dizziness, syncope, weakness, light-headedness, numbness and headaches.   Psychiatric/Behavioral:  Negative for dysphoric mood and sleep disturbance. The patient is not nervous/anxious.        Objective   /78 (BP Location: Left arm)   Pulse 85   Temp 35.6 °C (96 °F)   Wt (!) 44.3 kg (97 lb 11.2 oz)   SpO2 99%   BMI 21.14 kg/m²     Physical Exam  Vitals and nursing note reviewed.   Constitutional:       Appearance: Normal appearance.   HENT:      Head: Normocephalic.   Cardiovascular:      Rate and Rhythm: Normal rate and regular rhythm.      Pulses: Normal pulses.      Heart  sounds: Normal heart sounds. No murmur heard.     No friction rub. No gallop.   Pulmonary:      Effort: Pulmonary effort is normal. No respiratory distress.      Breath sounds: Normal breath sounds. No wheezing.   Abdominal:      General: There is no distension.      Palpations: Abdomen is soft.      Tenderness: There is no abdominal tenderness.   Musculoskeletal:         General: No deformity. Normal range of motion.   Skin:     General: Skin is warm and dry.      Capillary Refill: Capillary refill takes less than 2 seconds.      Findings: Lesion present.      Comments: LEFT CHECK/NEEDS BIOPSY    Neurological:      General: No focal deficit present.      Mental Status: She is alert and oriented to person, place, and time.   Psychiatric:         Mood and Affect: Mood normal.         Assessment/Plan   Problem List Items Addressed This Visit             ICD-10-CM    CKD (chronic kidney disease), stage III (Multi) - Primary N18.30    Atypical squamoproliferative skin lesion D49.2

## 2025-02-03 NOTE — PATIENT INSTRUCTIONS
RESCHEDULE FOR SHAVE /OR PUNCH BIOPSY OF FACE LESION   TRIGLYCERIDES A LITTLE HIGH/RESUME A LOWER SUGARS DIET   RENAL FUNCTION IS STABLE

## 2025-02-24 ENCOUNTER — APPOINTMENT (OUTPATIENT)
Dept: PRIMARY CARE | Facility: CLINIC | Age: 80
End: 2025-02-24
Payer: MEDICARE

## 2025-02-24 VITALS
WEIGHT: 98 LBS | SYSTOLIC BLOOD PRESSURE: 128 MMHG | TEMPERATURE: 97.4 F | DIASTOLIC BLOOD PRESSURE: 56 MMHG | OXYGEN SATURATION: 98 % | HEART RATE: 101 BPM | BODY MASS INDEX: 21.21 KG/M2

## 2025-02-24 DIAGNOSIS — L98.9 SKIN LESION: Primary | ICD-10-CM

## 2025-02-24 PROCEDURE — 11104 PUNCH BX SKIN SINGLE LESION: CPT | Performed by: FAMILY MEDICINE

## 2025-02-24 PROCEDURE — 1159F MED LIST DOCD IN RCRD: CPT | Performed by: FAMILY MEDICINE

## 2025-02-24 PROCEDURE — 3078F DIAST BP <80 MM HG: CPT | Performed by: FAMILY MEDICINE

## 2025-02-24 PROCEDURE — 3074F SYST BP LT 130 MM HG: CPT | Performed by: FAMILY MEDICINE

## 2025-02-24 RX ORDER — LIDOCAINE HYDROCHLORIDE 10 MG/ML
0.5 INJECTION, SOLUTION INFILTRATION; PERINEURAL ONCE
Status: COMPLETED | OUTPATIENT
Start: 2025-02-24 | End: 2025-02-24

## 2025-02-24 RX ADMIN — LIDOCAINE HYDROCHLORIDE 0.5 ML: 10 INJECTION, SOLUTION INFILTRATION; PERINEURAL at 11:11

## 2025-02-24 ASSESSMENT — PATIENT HEALTH QUESTIONNAIRE - PHQ9
SUM OF ALL RESPONSES TO PHQ9 QUESTIONS 1 AND 2: 0
1. LITTLE INTEREST OR PLEASURE IN DOING THINGS: NOT AT ALL
2. FEELING DOWN, DEPRESSED OR HOPELESS: NOT AT ALL

## 2025-02-24 NOTE — PATIENT INSTRUCTIONS
KEEP THE WOUND CLEAN AND DRY   YOU MAY BATHE BUT DO NOT SCRUB THE SURGERY SITE   FOLLOW SOONER IF ANY SIGNS OF INFECTION

## 2025-02-24 NOTE — PROGRESS NOTES
Subjective   Patient ID: Marleni Childers is a 79 y.o. female who presents for Procedure (BIOPSY- FACIAL LESION- L CHEEK).    HPI HERE FOR BIOPSY DARK AND RAISED LESION LEFT FACE   DECLINED REFERRED TO DERMATOLOGY   SHE IS MADE AWARE THAT IF THIS REQUIRES FURTHER SURGERY /A DEEPER OR WIDER EXCISION THAT I WILL HAVE HER SEE A SURGEON FOR THAT AND SHE ACCEPTS THIS     Review of SystemsSINCE THE  VISIT NO INTERVAL HISTORICAL CHANGES IN ANY OF THE 12 SYSTEMS REVIEWED OTHER THAN THIS DARKER AND RASIED AND ROUGH LESION LEFT FACE     Objective   /56   Pulse 101   Temp 36.3 °C (97.4 °F)   Wt (!) 44.5 kg (98 lb)   SpO2 98%   BMI 21.21 kg/m²     Physical ExamSINCE RECENT VISIT NO INTERVAL PHYSICAL CHANGES IN ANY OF THE 12 SYSTEMS REVIEWED OTHER THAN A RASIED  0.5 CM LESION THAT IS DARK AND ROUGH       Patient ID: Marleni Childers is a 79 y.o. female.    Procedures the lesion on her left cheek is reviewed for biopsy here at the office.  The lesion is small in size 0.25-0.5 left cheek it is raised and rough to touch.  It is dark in color.  The area was appropriately anesthetized with 0.56 mL of lidocaine 1% then using a punch biopsy 4 mm in size the area was encompassed and removed.  The tissue is very friable and all pieces are submitted in solution to the pathologist for evaluation.  Bleeding was minimal and well-controlled with both pressure and a AG2N03 chemical cautery.  The patient was given instructions in the postop care she will follow here if needed.   The specimen was sent for evaluation and she will be notified.  Assessment/Plan   Problem List Items Addressed This Visit             ICD-10-CM    Skin lesion - Primary L98.9    Relevant Medications    lidocaine (Xylocaine) 10 mg/mL (1 %) injection 0.5 mL (Completed)    Other Relevant Orders    DERMATOPATHOLOGY -DERMPATH LAB

## 2025-02-27 LAB
LABORATORY COMMENT REPORT: NORMAL
PATH REPORT.FINAL DX SPEC: NORMAL
PATH REPORT.GROSS SPEC: NORMAL
PATH REPORT.MICROSCOPIC SPEC OTHER STN: NORMAL
PATH REPORT.RELEVANT HX SPEC: NORMAL
PATH REPORT.TOTAL CANCER: NORMAL

## 2025-02-28 DIAGNOSIS — D49.2 ATYPICAL SQUAMOPROLIFERATIVE SKIN LESION: Primary | ICD-10-CM

## 2025-06-05 DIAGNOSIS — E78.2 MIXED HYPERLIPIDEMIA: ICD-10-CM

## 2025-06-05 DIAGNOSIS — I10 PRIMARY HYPERTENSION: ICD-10-CM

## 2025-06-06 RX ORDER — AMLODIPINE BESYLATE 10 MG/1
10 TABLET ORAL DAILY
Qty: 90 TABLET | Refills: 1 | Status: SHIPPED | OUTPATIENT
Start: 2025-06-06

## 2025-06-06 RX ORDER — SIMVASTATIN 20 MG/1
20 TABLET, FILM COATED ORAL DAILY
Qty: 90 TABLET | Refills: 1 | Status: SHIPPED | OUTPATIENT
Start: 2025-06-06

## 2025-06-09 ENCOUNTER — APPOINTMENT (OUTPATIENT)
Dept: PRIMARY CARE | Facility: CLINIC | Age: 80
End: 2025-06-09
Payer: MEDICARE

## 2025-06-09 VITALS
HEART RATE: 95 BPM | BODY MASS INDEX: 19.91 KG/M2 | DIASTOLIC BLOOD PRESSURE: 74 MMHG | SYSTOLIC BLOOD PRESSURE: 124 MMHG | OXYGEN SATURATION: 98 % | WEIGHT: 92 LBS | TEMPERATURE: 96.1 F

## 2025-06-09 DIAGNOSIS — H00.011 HORDEOLUM EXTERNUM OF RIGHT UPPER EYELID: Primary | ICD-10-CM

## 2025-06-09 PROCEDURE — 1036F TOBACCO NON-USER: CPT | Performed by: FAMILY MEDICINE

## 2025-06-09 PROCEDURE — 3078F DIAST BP <80 MM HG: CPT | Performed by: FAMILY MEDICINE

## 2025-06-09 PROCEDURE — 3074F SYST BP LT 130 MM HG: CPT | Performed by: FAMILY MEDICINE

## 2025-06-09 PROCEDURE — 1159F MED LIST DOCD IN RCRD: CPT | Performed by: FAMILY MEDICINE

## 2025-06-09 PROCEDURE — 99213 OFFICE O/P EST LOW 20 MIN: CPT | Performed by: FAMILY MEDICINE

## 2025-06-09 ASSESSMENT — ENCOUNTER SYMPTOMS
FEVER: 0
RHINORRHEA: 0
SHORTNESS OF BREATH: 0
CONSTIPATION: 0
ARTHRALGIAS: 0
COUGH: 0
EYE ITCHING: 0
NAUSEA: 0
DYSURIA: 0
MYALGIAS: 0
BLOOD IN STOOL: 0
DIZZINESS: 0
PALPITATIONS: 0
UNEXPECTED WEIGHT CHANGE: 0
NUMBNESS: 0
EYE DISCHARGE: 0
HEADACHES: 0
ABDOMINAL PAIN: 0
SORE THROAT: 0
FLANK PAIN: 0
JOINT SWELLING: 0
DIARRHEA: 0
SINUS PRESSURE: 0
ACTIVITY CHANGE: 0
HEMATURIA: 0
DYSPHORIC MOOD: 0
NERVOUS/ANXIOUS: 0
WHEEZING: 0
WEAKNESS: 0
SLEEP DISTURBANCE: 0
VOMITING: 0
LIGHT-HEADEDNESS: 0
APPETITE CHANGE: 0

## 2025-06-09 NOTE — LETTER
iac and Vasculature      Primary hypertension  Medium             Pure hypercholesterolemia  Medium             ENT      Cholesteatoma of right ear  Medium             Endocrine/Metabolic      Osteoporosis  Medium             Eye      Lesion of eyelid  Medium             Gastrointestinal and Abdominal      Gastroesophageal reflux disease  Medium             Genitourinary and Reproductive      CKD (chronic kidney disease), stage III (Multi)  Medium             Encounter for screening mammogram for breast cancer  Medium             Asymptomatic menopausal state  Medium             Hematology and Neoplasia      Atypical squamoproliferative skin lesion  Medium          Enter Staging Information  [place] as Not Needed   Musculoskeletal and Injuries      Arthritis  Medium             Skin      Atypical pigmented lesion  Medium             Skin lesion  Medium             Verrucous lesion of skin  Medium

## 2025-06-09 NOTE — PROGRESS NOTES
Subjective   Patient ID: Marleni Childers is a 79 y.o. female who presents for Eye Problem (Bump on the R eyelid.  Marleni was washing it with baby soap like she was told and it seemed to get smaller, but would come right back.  No drainage.  She also mentions having a few others that have disappeared.).  HPI  SAW OPTOMETRIST WHO ADVISED THE BABY SHAMPOO AND THE OTHER STYES WENT AWAY THIS ONE HAS NOT   Review of Systems   Constitutional:  Negative for activity change, appetite change, fever and unexpected weight change.   HENT:  Negative for congestion, ear pain, postnasal drip, rhinorrhea, sinus pressure and sore throat.    Eyes:  Negative for discharge, itching and visual disturbance.        UPPER EYE LID STYE  DOES NOT HURT OR INTERFERE WITH VISION   THE OPTOMETRIST SAID THAT IT SHOULD BE JUST LIVED WITH AND USE THE BABY SHAMPOO   I AGREE    Respiratory:  Negative for cough, shortness of breath and wheezing.    Cardiovascular:  Negative for chest pain, palpitations and leg swelling.   Gastrointestinal:  Negative for abdominal pain, blood in stool, constipation, diarrhea, nausea and vomiting.   Endocrine: Negative for cold intolerance, heat intolerance and polyuria.   Genitourinary:  Negative for dysuria, flank pain and hematuria.   Musculoskeletal:  Negative for arthralgias, gait problem, joint swelling and myalgias.   Skin:  Negative for rash.   Allergic/Immunologic: Negative for environmental allergies and food allergies.   Neurological:  Negative for dizziness, syncope, weakness, light-headedness, numbness and headaches.   Psychiatric/Behavioral:  Negative for dysphoric mood and sleep disturbance. The patient is not nervous/anxious.            10/24/2023     3:28 PM 1/12/2024     1:14 PM 7/22/2024    11:27 AM 10/17/2024     1:05 PM 2/3/2025    10:16 AM 2/24/2025    10:48 AM 6/9/2025     8:26 AM   Vitals   Systolic 126 124 124  120 128 124   Diastolic 62 60 72  78 56 74   BP Location     Left arm     Heart Rate 89 90  "101  85 101 95   Temp 36.1 °C (97 °F) 36.6 °C (97.8 °F) 35.9 °C (96.6 °F)  35.6 °C (96 °F) 36.3 °C (97.4 °F) 35.6 °C (96.1 °F)   Height  1.448 m (4' 9\")  1.448 m (4' 9\")      Weight (lb) 100 98 98 97 97.7 98 92   BMI 21.45 kg/m2 21.21 kg/m2 21.21 kg/m2 20.99 kg/m2 21.14 kg/m2 21.21 kg/m2 19.91 kg/m2   BSA (m2) 1.35 m2 1.34 m2 1.34 m2 1.33 m2 1.33 m2 1.34 m2 1.3 m2   Visit Report Report Report Report  Report Report Report       Body mass index is 19.91 kg/m².      Objective   Physical Exam  Constitutional:       Appearance: Normal appearance.   HENT:      Head: Normocephalic and atraumatic.   Eyes:      Extraocular Movements: Extraocular movements intact.      Pupils: Pupils are equal, round, and reactive to light.      Comments: RATHER LARGE STYE UPPER LID  NI REDNESS SWELLING   DOES NOT INTERFERE WITH VISION   CONCERN IS COSMETIC   ENCOURAGED USING THE BABY SHAMPOO AND FOLLOW    Cardiovascular:      Rate and Rhythm: Normal rate.   Pulmonary:      Effort: Pulmonary effort is normal.      Breath sounds: Normal breath sounds.   Abdominal:      Palpations: Abdomen is soft.   Musculoskeletal:         General: Normal range of motion.      Cervical back: Neck supple.   Skin:     General: Skin is warm and dry.   Neurological:      General: No focal deficit present.      Mental Status: She is alert and oriented to person, place, and time.   Psychiatric:         Mood and Affect: Mood normal.         Behavior: Behavior normal.         Assessment/Plan   Problem List Items Addressed This Visit    None  Visit Diagnoses         Hordeolum externum of right upper eyelid    -  Primary               Gina Ivy DO     "

## 2025-07-07 ENCOUNTER — APPOINTMENT (OUTPATIENT)
Dept: PRIMARY CARE | Facility: CLINIC | Age: 80
End: 2025-07-07
Payer: MEDICARE

## 2025-07-07 VITALS
WEIGHT: 91.7 LBS | DIASTOLIC BLOOD PRESSURE: 62 MMHG | HEART RATE: 78 BPM | OXYGEN SATURATION: 97 % | BODY MASS INDEX: 19.84 KG/M2 | SYSTOLIC BLOOD PRESSURE: 128 MMHG | TEMPERATURE: 97.1 F

## 2025-07-07 DIAGNOSIS — R53.83 OTHER FATIGUE: Primary | ICD-10-CM

## 2025-07-07 DIAGNOSIS — R63.4 WEIGHT LOSS: ICD-10-CM

## 2025-07-07 DIAGNOSIS — R41.3 MEMORY CHANGES: ICD-10-CM

## 2025-07-07 DIAGNOSIS — N18.31 CHRONIC KIDNEY DISEASE, STAGE 3A (MULTI): ICD-10-CM

## 2025-07-07 PROCEDURE — 1036F TOBACCO NON-USER: CPT | Performed by: FAMILY MEDICINE

## 2025-07-07 PROCEDURE — 3074F SYST BP LT 130 MM HG: CPT | Performed by: FAMILY MEDICINE

## 2025-07-07 PROCEDURE — 1159F MED LIST DOCD IN RCRD: CPT | Performed by: FAMILY MEDICINE

## 2025-07-07 PROCEDURE — 3078F DIAST BP <80 MM HG: CPT | Performed by: FAMILY MEDICINE

## 2025-07-07 PROCEDURE — 99214 OFFICE O/P EST MOD 30 MIN: CPT | Performed by: FAMILY MEDICINE

## 2025-07-07 NOTE — PATIENT INSTRUCTIONS
Labs taken and the office will call about the results   THESE ARE CHECKED BECAUSE OF THE TIREDNESS

## 2025-07-07 NOTE — PROGRESS NOTES
"Subjective   Patient ID: Marleni Childers is a 80 y.o. female who presents for Blood Pressure Check (Marleni is here for bp check also she's concerned about been tired everytime and some memory loss).  HPI  PATIENT WAS JUST HERE ABOUT THE EYELID CHALZION AND IT IS NOT WORSE   HAD FAMILY VISIT AND SHE IS SO TIRED AND IS ALL THE TIME  FAMILY THINKS THAT SHE IS FORGETFUL BUT THIS DOES NOT BOTHER HERE   Review of SystemsSINCE THE  VISIT NO INTERVAL HISTORICAL CHANGES IN ANY OF THE 12 SYSTEMS REVIEWED OTHER THAN FORGETTING THINGS, WEIGHT LOSS AND FATIGUE         1/12/2024     1:14 PM 7/22/2024    11:27 AM 10/17/2024     1:05 PM 2/3/2025    10:16 AM 2/24/2025    10:48 AM 6/9/2025     8:26 AM 7/7/2025    11:16 AM   Vitals   Systolic 124 124  120 128 124 128   Diastolic 60 72  78 56 74 62   BP Location    Left arm      Heart Rate 90 101  85 101 95 78   Temp 36.6 °C (97.8 °F) 35.9 °C (96.6 °F)  35.6 °C (96 °F) 36.3 °C (97.4 °F) 35.6 °C (96.1 °F) 36.2 °C (97.1 °F)   Height 1.448 m (4' 9\")  1.448 m (4' 9\")       Weight (lb) 98 98 97 97.7 98 92 91.7   BMI 21.21 kg/m2 21.21 kg/m2 20.99 kg/m2 21.14 kg/m2 21.21 kg/m2 19.91 kg/m2 19.84 kg/m2   BSA (m2) 1.34 m2 1.34 m2 1.33 m2 1.33 m2 1.34 m2 1.3 m2 1.29 m2   Visit Report Report Report  Report Report Report Report       Body mass index is 19.84 kg/m².      Objective   Physical ExamSINCE RECENT VISIT NO INTERVAL PHYSICAL CHANGES IN ANY OF THE 12 SYSTEMS REVIEWED OTHER THAN SHE HAS LOST WEIGHT BMI THIS YEAR 21 NOW 19   MEMORY ABOUT THE SAME AND COMPATIBLE WITH AGE  AND THE FATIGUE NEEDS CHECKED OUT     Assessment/Plan   Problem List Items Addressed This Visit    None  Visit Diagnoses         Other fatigue    -  Primary    Relevant Orders    CBC and Auto Differential    Iron and TIBC    Vitamin B12      Weight loss        Relevant Orders    CBC and Auto Differential    Iron and TIBC    Vitamin B12      Memory changes        Relevant Orders    CBC and Auto Differential    Iron and TIBC    " Vitamin B12      Chronic kidney disease, stage 3a (Multi)        Relevant Orders    Iron and TIBC               Gina Ivy DO

## 2025-07-08 LAB
BASOPHILS # BLD AUTO: 66 CELLS/UL (ref 0–200)
BASOPHILS NFR BLD AUTO: 0.5 %
EOSINOPHIL # BLD AUTO: 26 CELLS/UL (ref 15–500)
EOSINOPHIL NFR BLD AUTO: 0.2 %
ERYTHROCYTE [DISTWIDTH] IN BLOOD BY AUTOMATED COUNT: 13.6 % (ref 11–15)
HCT VFR BLD AUTO: 39.7 % (ref 35–45)
HGB BLD-MCNC: 12.8 G/DL (ref 11.7–15.5)
IRON SATN MFR SERPL: 13 % (CALC) (ref 16–45)
IRON SERPL-MCNC: 45 MCG/DL (ref 45–160)
LYMPHOCYTES # BLD AUTO: 2057 CELLS/UL (ref 850–3900)
LYMPHOCYTES NFR BLD AUTO: 15.7 %
MCH RBC QN AUTO: 27.5 PG (ref 27–33)
MCHC RBC AUTO-ENTMCNC: 32.2 G/DL (ref 32–36)
MCV RBC AUTO: 85.2 FL (ref 80–100)
MONOCYTES # BLD AUTO: 681 CELLS/UL (ref 200–950)
MONOCYTES NFR BLD AUTO: 5.2 %
NEUTROPHILS # BLD AUTO: ABNORMAL CELLS/UL (ref 1500–7800)
NEUTROPHILS NFR BLD AUTO: 78.4 %
PLATELET # BLD AUTO: 471 THOUSAND/UL (ref 140–400)
PMV BLD REES-ECKER: 9.9 FL (ref 7.5–12.5)
RBC # BLD AUTO: 4.66 MILLION/UL (ref 3.8–5.1)
TIBC SERPL-MCNC: 336 MCG/DL (CALC) (ref 250–450)
VIT B12 SERPL-MCNC: 336 PG/ML (ref 200–1100)
WBC # BLD AUTO: 13.1 THOUSAND/UL (ref 3.8–10.8)

## 2025-07-09 ENCOUNTER — TELEPHONE (OUTPATIENT)
Dept: PRIMARY CARE | Facility: CLINIC | Age: 80
End: 2025-07-09
Payer: MEDICARE

## 2025-07-09 NOTE — TELEPHONE ENCOUNTER
Spoke with Marleni and she is aware of her results.  She will schedule after she starts taking the recommended MV.

## 2025-07-09 NOTE — TELEPHONE ENCOUNTER
----- Message from Gina Ivy sent at 7/8/2025 10:13 AM EDT -----  NEEDS A MVI WITH IRON AND B12   FOLLOW IN ONE MONTH FOR RECHECK   ----- Message -----  From: Gagan Thornton Results In  Sent: 7/8/2025   7:51 AM EDT  To: Gina Ivy, DO

## 2025-07-11 ENCOUNTER — TELEPHONE (OUTPATIENT)
Dept: PRIMARY CARE | Facility: CLINIC | Age: 80
End: 2025-07-11
Payer: MEDICARE

## 2025-07-11 NOTE — TELEPHONE ENCOUNTER
Stated that she started taking the multi Vitamin.  She was still concerned regarding her memory loss.  I had explained that she is scheduled for labs next month.

## 2025-08-13 ENCOUNTER — APPOINTMENT (OUTPATIENT)
Dept: PRIMARY CARE | Facility: CLINIC | Age: 80
End: 2025-08-13
Payer: MEDICARE

## 2025-08-13 DIAGNOSIS — D50.9 IRON DEFICIENCY ANEMIA, UNSPECIFIED IRON DEFICIENCY ANEMIA TYPE: ICD-10-CM

## 2025-08-14 LAB
BASOPHILS # BLD AUTO: 40 CELLS/UL (ref 0–200)
BASOPHILS NFR BLD AUTO: 0.4 %
EOSINOPHIL # BLD AUTO: 59 CELLS/UL (ref 15–500)
EOSINOPHIL NFR BLD AUTO: 0.6 %
ERYTHROCYTE [DISTWIDTH] IN BLOOD BY AUTOMATED COUNT: 14.5 % (ref 11–15)
HCT VFR BLD AUTO: 39.6 % (ref 35–45)
HGB BLD-MCNC: 12.8 G/DL (ref 11.7–15.5)
IRON SATN MFR SERPL: 15 % (CALC) (ref 16–45)
IRON SERPL-MCNC: 51 MCG/DL (ref 45–160)
LYMPHOCYTES # BLD AUTO: 1812 CELLS/UL (ref 850–3900)
LYMPHOCYTES NFR BLD AUTO: 18.3 %
MCH RBC QN AUTO: 28.1 PG (ref 27–33)
MCHC RBC AUTO-ENTMCNC: 32.3 G/DL (ref 32–36)
MCV RBC AUTO: 86.8 FL (ref 80–100)
MONOCYTES # BLD AUTO: 604 CELLS/UL (ref 200–950)
MONOCYTES NFR BLD AUTO: 6.1 %
NEUTROPHILS # BLD AUTO: 7385 CELLS/UL (ref 1500–7800)
NEUTROPHILS NFR BLD AUTO: 74.6 %
PLATELET # BLD AUTO: 406 THOUSAND/UL (ref 140–400)
PMV BLD REES-ECKER: 9.3 FL (ref 7.5–12.5)
RBC # BLD AUTO: 4.56 MILLION/UL (ref 3.8–5.1)
TIBC SERPL-MCNC: 334 MCG/DL (CALC) (ref 250–450)
WBC # BLD AUTO: 9.9 THOUSAND/UL (ref 3.8–10.8)